# Patient Record
Sex: MALE
[De-identification: names, ages, dates, MRNs, and addresses within clinical notes are randomized per-mention and may not be internally consistent; named-entity substitution may affect disease eponyms.]

---

## 2017-03-20 NOTE — CR
EXAMINATION: Portable chest radiograph.

 

HISTORY: Shortness of breath.

 

FINDINGS: 

The trachea is midline. The cardiomediastinal silhouette is within normal limits. No pulmonary infil
trates, effusions or pneumothorax.

 

Osseous structures appear unremarkable.

 

IMPRESSION: 

No acute cardiopulmonary process.

## 2017-03-20 NOTE — EDM.PDOC
ED HISTORY OF PRESENT ILLNESS





- General


Chief Complaint: Cardiovascular Problem


Stated Complaint: CHEST PAIN


Time Seen by Provider: 03/20/17 13:30





- History of Present Illness


INITIAL COMMENTS - FREE TEXT/NARRATIVE: 





HISTORY AND PHYSICAL:





History of present illness:


The patient is a 41-year-old male with a history of hypertension 

hypercholesterolemia who had angioplasty and stent stents done on January 30 of 

this year at [facility] with Dr. Christensen and presents with complaints of 

feeling irregular heartbeat that started at 3 AM this morning. The patient 

stated a normal day yesterday with no systemic complaints of cough fever chills 

chest pain shortness of breath abdominal pain vomiting or diarrhea and has been 

eating and drinking normally. He states compliance with his medications 

including Plavix. He states he had an angioplasty and 2 stents placed on 

January 30 of this year after being transferred from the ER. He saw Dr. Christensen 

approximately 2 weeks ago in the office and he was cleared to not come back for 

the next 6 months. There were no medication changes on that visit. The patient 

states that at 3 AM this morning he started feeling irregular heart beats and 

that they have been constantly occurring since that time until the present. He 

went to cardiac rehabilitation this morning and states that the nurse saw one 

irregular beat while he was there. He says he came into the ER because they 

have been happening at shorter intervals and occurring more frequently. He 

denies any chest pain or shortness of breath with this no nausea vomiting and 

no abdominal pain. The patient states he has a history of heavy caffeine use 

but has cut back to only one Monster  drink a day


Upon repeated questioning with the patient he says he asked we does not feel 

any discomfort with these irregular beats but because they're happening more 

frequently he is feeling somewhat anxious about them. 


Review of systems: 


As per history of present illness and below otherwise all systems reviewed and 

negative.





Past medical history: 


As per history of present illness and as reviewed below otherwise 

noncontributory.





Surgical history: 


As per history of present illness and as reviewed below otherwise 

noncontributory.





Social history: 


No reported history of drug or alcohol abuse.





Family history: 


As per history of present illness and as reviewed below otherwise 

noncontributory.





Physical exam:


General: Well-developed well-nourished male who is nontoxic and still mildly 

overweight who is in no distress. On the monitor I personally was able to 

witness to random PVCs.


HEENT: Atraumatic, normocephalic, pupils reactive, negative for conjunctival 

pallor or scleral icterus, mucous membranes moist, throat clear, neck supple, 

nontender, trachea midline.


Lungs: Clear to auscultation, breath sounds equal bilaterally, chest nontender.


Heart: S1S2, regular, negative for clicks, rubs, or JVD.


Abdomen: Soft, nondistended, nontender. Negative for masses or 

hepatosplenomegaly. Negative for costovertebral tenderness.


Pelvis: Stable nontender.


Genitourinary: Deferred.


Rectal: Deferred.


Extremities: Atraumatic, negative for cords or calf pain. Neurovascular 

unremarkable. No pedal edema


Neuro: Awake, alert, oriented. Cranial nerves II through XII unremarkable. 

Cerebellum unremarkable. Motor and sensory unremarkable throughout. Exam 

nonfocal.





Diagnostics:


EKG chest x-ray CBC CMP INR troponin





Therapeutics:


IV O2 monitor





1435: While patient was here in the ER he did have an occasional PVC and has 

had heart rates in the [place] 90s with good blood pressures. I discussed the 

case with Dr. Christensen, his cardiologist at [facility] in [place], and he would 

like to do a 24-hour Holter monitor which will help arrange to be done. He also 

recommended that we increase his Toprol XL to 50 mg twice a day. I discussed 

this plan with the patient and wife at bedside and they are comfortable with 

this. I recommended that he return to the ER if anything changes


Impression: 


Irregular heartbeats with history of recent angioplasty and stents stable





Definitive disposition and diagnosis as appropriate pending reevaluation and 

review of above.





- Related Data


Allergies/ADRs: 


 Allergies











Allergy/AdvReac Type Severity Reaction Status Date / Time


 


No Known Allergies Allergy   Verified 03/20/17 13:30











Home Meds: 


 Home Meds





Clopidogrel [Plavix] 75 mg PO BEDTIME 01/29/17 [History]


Isosorbide Dinitrate 30 mg PO BID 01/29/17 [History]


Metoprolol Succinate [Toprol XL 50mg] 50 mg PO DAILY 01/29/17 [History]


atorvaSTATin [Lipitor] 20 mg PO ONETIME 01/29/17 [History]


Cyclobenzaprine [Flexeril] 1 tab PO ASDIRECTED PRN 03/20/17 [History]


Nitroglycerin [Nitrostat] 1 tab SL ASDIRECTED 03/20/17 [History]


Tapentadol HCl [Nucynta] 2 tab PO QID 03/20/17 [History]


Varenicline Tartrate [Chantix] 1 tab PO BID 03/20/17 [History]


Zolpidem [Ambien] 1 tab PO BEDTIME 03/20/17 [History]











Past Medical History





- Past Health History


Medical/Surgical History: Denies Medical/Surgical History


HEENT History: Reports: None


Cardiovascular History: Reports: CAD


Other Cardiovascular History: Heart attack last month; no stents


Respiratory History: Reports: None


Musculoskeletal History: Reports: Other (see below)


Other Musculoskeletal History: Back injury


Psychiatric History: Reports: None





- Infectious Disease History


Infectious Disease History: Reports: Chicken pox





- Past Surgical History


HEENT Surgical History: Reports: None


Cardiovascular Surgical History: Reports: Coronary artery stent, Percutaneous 

transluminal angioplasty


GI Surgical History: Reports: Appendectomy, Hernia, abdominal


Musculoskeletal Surgical History: Reports: None





Social & Family History





- Family History


Family Medical History: Noncontributory


Cardiac: Reports: Hypertension, Other (see below)


Other Cardiac Family History: father have cardiac problem


Endocrine/Metabolic: Reports: Diabetes, type II





- Tobacco Use


Smoking Status *Q: Current Every Day Smoker


Years of Tobacco use: 20


Packs/Tins Daily: 1


Second Hand Smoke Exposure: Yes





- Caffeine Use


Caffeine Use: Reports: Soda


Caffeine Use Comment: 3cups/day





- Recreational Drug Use


Recreational Drug Use: No





ED ROS GENERAL





- Review of Systems


Review Of Systems: ROS reveals no pertinent complaints other than HPI.





ED EXAM, GENERAL





- Physical Exam


Exam: See Below (See dictation)





Course





- Vital Signs


Last Recorded V/S: 


 Last Vital Signs











Temp  36.1 C   03/20/17 13:30


 


Pulse  90   03/20/17 13:30


 


Resp  20   03/20/17 13:30


 


BP  134/82   03/20/17 13:30


 


Pulse Ox  96   03/20/17 13:39














- Orders/Labs/Meds


Orders: 


 Active Orders 24 hr











 Category Date Time Status


 


 Cardiac Monitoring [RC] .AS DIRECTED Care  03/20/17 13:39 Active


 


 EKG Documentation Completion [RC] STAT Care  03/20/17 13:39 Active


 


 Oxygen Therapy, ED [RC] ASDIRECTED Care  03/20/17 13:39 Active


 


 Pulse Oximetry [RC] ASDIRECTED Care  03/20/17 13:39 Active


 


 Sodium Chloride 0.9% [Saline Flush] Med  03/20/17 13:39 Active





 10 ml FLUSH ASDIRECTED PRN   


 


 Sodium Chloride 0.9% [Saline Flush] Med  03/20/17 13:39 Active





 2.5 ml FLUSH ASDIRECTED PRN   


 


 Saline Lock Insert [OM.PC] Stat Oth  03/20/17 13:39 Ordered








 Medication Orders





Sodium Chloride (Saline Flush)  10 ml FLUSH ASDIRECTED PRN


   PRN Reason: Keep Vein Open


Sodium Chloride (Saline Flush)  2.5 ml FLUSH ASDIRECTED PRN


   PRN Reason: Keep Vein Open








Labs: 


 Laboratory Tests











  03/20/17 03/20/17 03/20/17 Range/Units





  13:30 13:30 13:30 


 


WBC  9.48    (4.0-11.0)  K/uL


 


RBC  4.69    (4.50-5.90)  M/uL


 


Hgb  14.4    (13.0-17.0)  g/dL


 


Hct  43.1    (38.0-50.0)  %


 


MCV  91.9    (80.0-98.0)  fL


 


MCH  30.7    (27.0-32.0)  pg


 


MCHC  33.4    (31.0-37.0)  g/dL


 


RDW Std Deviation  43.4    (28.0-62.0)  fl


 


RDW Coeff of Sharif  13    (11.0-15.0)  %


 


Plt Count  260    (150-400)  K/uL


 


MPV  9.60    (7.40-12.00)  fL


 


Neut % (Auto)  61.7    (48.0-80.0)  %


 


Lymph % (Auto)  27.5    (16.0-40.0)  %


 


Mono % (Auto)  8.2    (0.0-15.0)  %


 


Eos % (Auto)  2.1    (0.0-7.0)  %


 


Baso % (Auto)  0.5    (0.0-1.5)  %


 


Neut #  5.8 H    (1.4-5.7)  K/uL


 


Lymph #  2.6 H    (0.6-2.4)  K/uL


 


Mono #  0.8    (0.0-0.8)  K/uL


 


Eos #  0.2    (0.0-0.7)  K/uL


 


Baso #  0.1    (0.0-0.1)  K/uL


 


Nucleated RBC %  0.0    /100WBC


 


Nucleated RBCs #  0    K/uL


 


INR   0.98   (0.86-1.11)  


 


Sodium    137  (136-146)  mmol/L


 


Potassium    3.9  (3.5-5.1)  mmol/L


 


Chloride    102  ()  mmol/L


 


Carbon Dioxide    25  (21-31)  mmol/L


 


BUN    9  (6.0-23.0)  mg/dL


 


Creatinine    1.1  (0.6-1.5)  mg/dL


 


Est Cr Clr Drug Dosing    95.04  mL/min


 


Estimated GFR (MDRD)    > 60.0  ml/min


 


Glucose    126 H  ()  mg/dL


 


Calcium    9.9  (8.8-10.8)  mg/dL


 


Magnesium     (1.5-2.3)  mEq/L


 


Total Bilirubin    0.3  (0.1-1.5)  mg/dL


 


AST    22  (5-40)  IU/L


 


ALT    35  (8-54)  IU/L


 


Alkaline Phosphatase    75  ()  


 


Troponin I     (0.0-0.29)  NG/ML


 


Total Protein    8.2 H  (6.0-8.0)  g/dL


 


Albumin    4.6  (3.5-5.0)  g/dL


 


Globulin    3.6 H  (2.0-3.5)  g/dL


 


Albumin/Globulin Ratio    1.3  (1.3-2.8)  














  03/20/17 03/20/17 Range/Units





  13:30 13:30 


 


WBC    (4.0-11.0)  K/uL


 


RBC    (4.50-5.90)  M/uL


 


Hgb    (13.0-17.0)  g/dL


 


Hct    (38.0-50.0)  %


 


MCV    (80.0-98.0)  fL


 


MCH    (27.0-32.0)  pg


 


MCHC    (31.0-37.0)  g/dL


 


RDW Std Deviation    (28.0-62.0)  fl


 


RDW Coeff of Sharif    (11.0-15.0)  %


 


Plt Count    (150-400)  K/uL


 


MPV    (7.40-12.00)  fL


 


Neut % (Auto)    (48.0-80.0)  %


 


Lymph % (Auto)    (16.0-40.0)  %


 


Mono % (Auto)    (0.0-15.0)  %


 


Eos % (Auto)    (0.0-7.0)  %


 


Baso % (Auto)    (0.0-1.5)  %


 


Neut #    (1.4-5.7)  K/uL


 


Lymph #    (0.6-2.4)  K/uL


 


Mono #    (0.0-0.8)  K/uL


 


Eos #    (0.0-0.7)  K/uL


 


Baso #    (0.0-0.1)  K/uL


 


Nucleated RBC %    /100WBC


 


Nucleated RBCs #    K/uL


 


INR    (0.86-1.11)  


 


Sodium    (136-146)  mmol/L


 


Potassium    (3.5-5.1)  mmol/L


 


Chloride    ()  mmol/L


 


Carbon Dioxide    (21-31)  mmol/L


 


BUN    (6.0-23.0)  mg/dL


 


Creatinine    (0.6-1.5)  mg/dL


 


Est Cr Clr Drug Dosing    mL/min


 


Estimated GFR (MDRD)    ml/min


 


Glucose    ()  mg/dL


 


Calcium    (8.8-10.8)  mg/dL


 


Magnesium   1.8  (1.5-2.3)  mEq/L


 


Total Bilirubin    (0.1-1.5)  mg/dL


 


AST    (5-40)  IU/L


 


ALT    (8-54)  IU/L


 


Alkaline Phosphatase    ()  


 


Troponin I  < 0.10   (0.0-0.29)  NG/ML


 


Total Protein    (6.0-8.0)  g/dL


 


Albumin    (3.5-5.0)  g/dL


 


Globulin    (2.0-3.5)  g/dL


 


Albumin/Globulin Ratio    (1.3-2.8)  











Meds: 


Medications











Generic Name Dose Route Start Last Admin





  Trade Name Freq  PRN Reason Stop Dose Admin


 


Sodium Chloride  10 ml  03/20/17 13:39  





  Saline Flush  FLUSH   





  ASDIRECTED PRN   





  Keep Vein Open   


 


Sodium Chloride  2.5 ml  03/20/17 13:39  





  Saline Flush  FLUSH   





  ASDIRECTED PRN   





  Keep Vein Open   














Departure





- Departure


Time of Disposition: 14:42


Disposition: Home, Self-Care 01


Condition: good


Clinical Impression: 


 Irregular heart beats





Forms:  ED Department Discharge


Additional Instructions: 


The following information is given to patients seen in the emergency department 

who are being discharged to home. This information is to outline your options 

for follow-up care. We provide all patients seen in our emergency department 

with a follow-up referral.





The need for follow-up, as well as the timing and circumstances, are variable 

depending upon the specifics of your emergency department visit.





If you don't have a primary care physician on staff, we will provide you with a 

referral. We always advise you to contact your personal physician following an 

emergency department visit to inform them of the circumstance of the visit and 

for follow-up with them and/or the need for any referrals to a consulting 

specialist.





The emergency department will also refer you to a specialist when appropriate. 

This referral assures that you have the opportunity for followup care with a 

specialist. All of these measure are taken in an effort to provide you with 

optimal care, which includes your followup.





Under all circumstances we always encourage you to contact your private 

physician who remains a resource for coordinating  your care. When calling for 

followup care, please make the office aware that this follow-up is from your 

recent emergency room visit. If for any reason you are refused follow-up, 

please contact the [facility] emergency 

department at [phone] and ask to speak to the emergency department 

charge nurse.





[facility] 


Primary care- [place] [address]


[phone]








Please go and have your Holter monitor done as directed by my nurses. Please 

call and schedule followup with Dr. Christensen at [facility] in [place] and also 

with primary care here locally. Return to ER as needed and as discussed. 

Increase your Toprol XL, metoprolol, from 50 mg once a day 50 mg twice a day.





- My Orders


Last 24 Hours: 


My Active Orders





03/20/17 13:39


Cardiac Monitoring [RC] .AS DIRECTED 


EKG Documentation Completion [RC] STAT 


Oxygen Therapy, ED [RC] ASDIRECTED 


Pulse Oximetry [RC] ASDIRECTED 


Sodium Chloride 0.9% [Saline Flush]   10 ml FLUSH ASDIRECTED PRN 


Sodium Chloride 0.9% [Saline Flush]   2.5 ml FLUSH ASDIRECTED PRN 


Saline Lock Insert [OM.PC] Stat 














- Assessment/Plan


Last 24 Hours: 


My Active Orders





03/20/17 13:39


Cardiac Monitoring [RC] .AS DIRECTED 


EKG Documentation Completion [RC] STAT 


Oxygen Therapy, ED [RC] ASDIRECTED 


Pulse Oximetry [RC] ASDIRECTED 


Sodium Chloride 0.9% [Saline Flush]   10 ml FLUSH ASDIRECTED PRN 


Sodium Chloride 0.9% [Saline Flush]   2.5 ml FLUSH ASDIRECTED PRN 


Saline Lock Insert [OM.PC] Stat

## 2017-06-11 NOTE — PCM.HP
H&P History of Present Illness





- History of Present Illness


Initial Comments - Free Text/Narative: 





40 yo male with pmh of CAD with MI and two stents placed five months ago.  He 

has been compliant with his medications in has not had to use nitro for chest 

pain in the past three months.  He presents to the ED with complaints of chest 

pain. He used nitro SL x3 at home with some releif.  He was evaluated in the ED 

with EKG and cardiac enzymes not showing signs of ischemia.  CTA of chest was 

normal.  He denies any shortness of breath, diaphoresis or cough.


  ** Anterior Chest


Pain Score (Numeric/FACES): 3





- Related Data


Allergies/Adverse Reactions: 


 Allergies











Allergy/AdvReac Type Severity Reaction Status Date / Time


 


No Known Allergies Allergy   Verified 06/10/17 23:39











Home Medications: 


 Home Meds





Clopidogrel [Plavix] 75 mg PO BEDTIME 01/29/17 [History]


Isosorbide Dinitrate 30 mg PO BID 01/29/17 [History]


Metoprolol Succinate [Toprol XL 50mg] 50 mg PO BID 01/29/17 [History]


atorvaSTATin [Lipitor] 20 mg PO BEDTIME 01/29/17 [History]


Cyclobenzaprine [Flexeril] 1 tab PO QID PRN 03/20/17 [History]


Nitroglycerin [Nitrostat] 1 tab SL ASDIRECTED 03/20/17 [History]


Tapentadol HCl [Nucynta] 1 tab PO TID 03/20/17 [History]


Aspirin 81 mg PO BRK 06/10/17 [History]











Past Medical History





- Past Health History


Medical/Surgical History: Denies Medical/Surgical History


HEENT History: Reports: None


Cardiovascular History: Reports: MI, Stents


Other Cardiovascular History: Heart attak last month;  no stents


Respiratory History: Reports: None


Gastrointestinal History: Reports: None


Genitourinary History: Reports: None


Musculoskeletal History: Reports: None


Other Musculoskeletal History: Back injury


Neurological History: Reports: None


Psychiatric History: Reports: None


Endocrine/Metabolic History: Reports: None


Hematologic History: Reports: None


Immunologic History: Reports: None


Oncologic (Cancer) History: Reports: None


Dermatologic History: Reports: None





- Infectious Disease History


Infectious Disease History: Reports: None





- Past Surgical History


Head Surgeries/Procedures: Reports: None


HEENT Surgical History: Reports: None


Cardiovascular Surgical History: Reports: Coronary Artery Stent, Percutaneous 

Transluminal Angioplasty


GI Surgical History: Reports: Appendectomy, Hernia, Abdominal


Male  Surgical History: Reports: None


Neurological Surgical History: Reports: None


Musculoskeletal Surgical History: Reports: None





Social & Family History





- Family History


Family Medical History: Noncontributory


Cardiac: Reports: Hypertension, Other (See Below)


Other Cardiac Family History: father have cardiac problem


Respiratory: Reports: None


GI: Reports: None


: Reports: None


Musculoskeletal: Reports: None


Neurological: Reports: None


Psychiatric: Reports: None


Endocrine/Metabolic: Reports: None, Diabetes, type II


Hematologic: Reports: None


Immunologic: Reports: None


Dermatologic: Reports: None


Oncologic: Reports: None





- Tobacco Use


Smoking Status *Q: Current Every Day Smoker


Years of Tobacco use: 20


Packs/Tins Daily: 1


Used Tobacco, but Quit: No


Month Tobacco Last Used: June


Second Hand Smoke Exposure: Yes





- Caffeine Use


Caffeine Use: Reports: Coffee, Energy Drinks


Caffeine Use Comment: 1 monster everyday





- Alcohol Use


Days Per Week of Alcohol Use: 1


Number of Drinks Per Day: 1


Total Drinks Per Week: 1


Date of Last Drink: 06/10/17


Time of Last Drink: 20:00





- Recreational Drug Use


Recreational Drug Use: No





H&P Review of Systems





- Review of Systems:


Review Of Systems: See Below


General: Reports: No Symptoms


HEENT: Reports: No Symptoms


Pulmonary: Reports: No Symptoms


Cardiovascular: Reports: No Symptoms


Gastrointestinal: Reports: No Symptoms


Genitourinary: Reports: No Symptoms


Musculoskeletal: Reports: No Symptoms


Skin: Reports: No Symptoms


Psychiatric: Reports: No Symptoms


Neurological: Reports: No Symptoms


Hematologic/Lymphatic: Reports: No Symptoms


Immunologic: Reports: No Symptoms





Exam





- Exam


Exam: See Below





- Vital Signs


Vital Signs: 


 Last Vital Signs











Temp  36.1 C   06/11/17 08:00


 


Pulse  71   06/11/17 08:50


 


Resp  20   06/11/17 08:00


 


BP  130/69   06/11/17 08:50


 


Pulse Ox  92 L  06/11/17 08:00











Weight: 122.7 kg





- Exam


General: Alert, Oriented, 4


Neck: Supple, Trachea Midline.  No: JVD


Lungs: Clear to Auscultation, Normal Respiratory Effort


Cardiovascular: Regular Rate, Regular Rhythm


Abdomen: Normal Bowel Sounds, Soft


Extremities: Normal Inspection


Skin: Warm, Dry, Intact


Neurological: No: Focal Deficit





- Patient Data


Lab Results last 24 hrs: 


 Laboratory Results - last 24 hr











  06/11/17 06/11/17 06/11/17 Range/Units





  05:50 05:50 05:50 


 


WBC   10.62   (4.0-11.0)  K/uL


 


RBC   4.35 L   (4.50-5.90)  M/uL


 


Hgb   13.5   (13.0-17.0)  g/dL


 


Hct   39.9   (38.0-50.0)  %


 


MCV   91.7   (80.0-98.0)  fL


 


MCH   31.0   (27.0-32.0)  pg


 


MCHC   33.8   (31.0-37.0)  g/dL


 


RDW Std Deviation   43.0   (28.0-62.0)  fl


 


RDW Coeff of Sharif   13   (11.0-15.0)  %


 


Plt Count   211   (150-400)  K/uL


 


MPV   10.20   (7.40-12.00)  fL


 


Neut % (Auto)   62.8   (48.0-80.0)  %


 


Lymph % (Auto)   26.9   (16.0-40.0)  %


 


Mono % (Auto)   7.6   (0.0-15.0)  %


 


Eos % (Auto)   2.4   (0.0-7.0)  %


 


Baso % (Auto)   0.3   (0.0-1.5)  %


 


Neut # (Auto)   6.7 H   (1.4-5.7)  K/uL


 


Lymph # (Auto)   2.9 H   (0.6-2.4)  K/uL


 


Mono # (Auto)   0.8   (0.0-0.8)  K/uL


 


Eos # (Auto)   0.3   (0.0-0.7)  K/uL


 


Baso # (Auto)   0.0   (0.0-0.1)  K/uL


 


Nucleated RBC %   0.0   /100WBC


 


Nucleated RBCs #   0   K/uL


 


Sodium    138  (136-146)  mmol/L


 


Potassium    4.1  (3.5-5.1)  mmol/L


 


Chloride    105  ()  mmol/L


 


Carbon Dioxide    25  (21-31)  mmol/L


 


BUN    13  (6.0-23.0)  mg/dL


 


Creatinine    1.0  (0.6-1.5)  mg/dL


 


Est Cr Clr Drug Dosing    104.54  mL/min


 


Estimated GFR (MDRD)    > 60.0  ml/min


 


Glucose    139 H  ()  mg/dL


 


Calcium    8.6 L  (8.8-10.8)  mg/dL


 


Troponin I  < 0.10    (0.0-0.29)  NG/ML











Result Diagrams: 


 06/11/17 05:50





 06/11/17 05:50





*Q Meaningful Use (ADM)





- VTE *Q


VTE Criteria *Q: 








- Stroke *Q


Stroke Criteria *Q: 








- AMI *Q


AMI Criteria *Q: 





Problem List Initiated/Reviewed/Updated: Yes


Orders Last 24hrs: 


 Active Orders 24 hr











 Category Date Time Status


 


 Communication Order [RC] DAILY Care  06/11/17 03:37 Active


 


 Communication Order [RC] ROUTINE Care  06/11/17 08:09 Active


 


 Telemetry Monitoring [Cardiac Monitoring] [RC] .AS Care  06/11/17 02:25 Active





 DIRECTED   


 


 Heart Healthy Diet [DIET] Diet  Breakfast Active


 


 TROPONIN I [CHEM] Q6H Lab  06/11/17 11:45 Ordered


 


 Acetaminophen [Tylenol] Med  06/11/17 08:09 Active





 650 mg PO Q6H PRN   


 


 Aspirin Med  06/11/17 09:00 Active





 81 mg PO DAILY   


 


 Clopidogrel [Plavix] Med  06/11/17 21:00 Active





 75 mg PO BEDTIME   


 


 Cyclobenzaprine [Flexeril] Med  06/11/17 03:37 Active





 10 mg PO QID PRN   


 


 Isosorbide Dinitrate [Isordil] Med  06/11/17 09:00 Active





 30 mg PO BID   


 


 Metoprolol Succinate [Toprol XL] Med  06/11/17 09:00 Active





 50 mg PO BID   


 


 Morphine Med  06/11/17 03:19 Active





 2 mg IVPUSH Q3H PRN   


 


 Nitroglycerin [Nitrostat] Med  06/11/17 03:45 Active





 0.4 mg SL ASDIRECTED   


 


 Patient's Own Medication [Ptom] Med  06/11/17 09:00 Active





 1 each PO TID@0900,1500,2200   


 


 atorvaSTATin [Lipitor] Med  06/11/17 21:00 Active





 20 mg PO BEDTIME   








 Medication Orders





Acetaminophen (Tylenol)  650 mg PO Q6H PRN


   PRN Reason: Pain


   Last Admin: 06/11/17 08:50  Dose: 650 mg


Aspirin (Aspirin)  81 mg PO DAILY AL


   Last Admin: 06/11/17 08:49  Dose: 81 mg


Atorvastatin Calcium (Lipitor)  20 mg PO BEDTIME [facility]


Clopidogrel Bisulfate (Plavix)  75 mg PO BEDTIME [facility]


Cyclobenzaprine HCl (Flexeril)  10 mg PO QID PRN


   PRN Reason: back pain


Isosorbide Dinitrate (Isordil)  30 mg PO BID [facility]


   Last Admin: 06/11/17 08:50  Dose: 30 mg


Metoprolol Succinate (Toprol Xl)  50 mg PO BID [facility]


   Last Admin: 06/11/17 08:50  Dose: 50 mg


Morphine Sulfate (Morphine)  2 mg IVPUSH Q3H PRN


   PRN Reason: Pain


   Last Admin: 06/11/17 04:06  Dose: 2 mg


Nitroglycerin (Nitrostat)  0.4 mg SL ASDIRECTED [facility]


Tapentadol 50mg  1 each PO TID@0900,1500,2200 [facility]


   Last Admin: 06/11/17 08:50  Dose: 1 each








Assessment/Plan Comment:: 





40 yo male who presented with chest pain.  He has ruled out for acute coronary 

syndrome with serial negative cardiac enzymes.  He has had no events on 

telemetry overnight.  He is being discharged home and will follow up with Dr. Solomon next week.

## 2017-06-11 NOTE — EDM.PDOC
ED HPI GENERAL MEDICAL PROBLEM





- General


Chief Complaint: Chest Pain


Stated Complaint: CHEST PAIN


Time Seen by Provider: 06/10/17 23:36


Source of Information: Reports: Patient


History Limitations: Reports: No Limitations





- History of Present Illness


INITIAL COMMENTS - FREE TEXT/NARRATIVE: 





HISTORY AND PHYSICAL:





History of present illness:


[41-year-old male history of prior MI with multiple stents placed now presents 

emergent [place] complaining of chest pain similar to his previous cardiac 

pain. Patient had onset of chest pain tonight pressure type no radiation mild 

shortness of air no pleuritic pain no productive cough or fever. Pain is not 

worse with movement. He did take 3 nitroglycerin at home with some relief. She 

did not take aspirin today. He is on Plavix with which he is compliant]





Review of systems: 


As per history of present illness and below otherwise all systems reviewed and 

negative.





Past medical history: 


As per history of present illness and as reviewed below otherwise 

noncontributory.





Surgical history: 


As per history of present illness and as reviewed below otherwise 

noncontributory.





Social history: 


No reported history of drug or alcohol abuse.





Family history: 


As per history of present illness and as reviewed below otherwise 

noncontributory.





Physical exam:


HEENT: Atraumatic, normocephalic, pupils reactive, negative for conjunctival 

pallor or scleral icterus, mucous membranes moist, throat clear, neck supple, 

nontender, trachea midline.


Lungs: Clear to auscultation, breath sounds equal bilaterally, chest nontender.


Heart: S1S2, regular, negative for clicks, rubs, or JVD.


Abdomen: Soft, nondistended, nontender. Negative for masses or 

hepatosplenomegaly. Negative for costovertebral tenderness.


Pelvis: Stable nontender.


Genitourinary: Deferred.


Rectal: Deferred.


Extremities: Atraumatic, negative for cords or calf pain. Neurovascular 

unremarkable.


Neuro: Awake, alert, oriented. Cranial nerves grossly unremarkable. Cerebellum 

unremarkable. Motor and sensory unremarkable throughout. Exam nonfocal.





Diagnostics:


[EKG normal sinus rhythm at 80 normal axis no STEMI


Chest x-ray ordered Megaly chronic changes no acute disease no change prior 

study]





Therapeutics:


[Aspirin Nitropaste given]





Impression: 


[]





Plan:


[Signs and symptoms consistent with chest pain a possible cardiac etiology in a 

patient with a known coronary artery disease history. EKG and chest x-ray 

unremarkable. CT of the chest pending to rule out less likely possibility of 

aortic involvement. Aspirin Nitropaste given. Labs pending including troponin. 

We will do observation telemetry admission and discussed with Dr. Abelardo Alas 

hospitalist on call.]





Definitive disposition and diagnosis as appropriate pending reevaluation and 

review of above.





  ** Anterior Chest


Pain Score (Numeric/FACES): 7





- Related Data


 Allergies











Allergy/AdvReac Type Severity Reaction Status Date / Time


 


No Known Allergies Allergy   Verified 06/10/17 23:39











Home Meds: 


 Home Meds





Clopidogrel [Plavix] 75 mg PO BEDTIME 01/29/17 [History]


Isosorbide Dinitrate 30 mg PO BID 01/29/17 [History]


Metoprolol Succinate [Toprol XL 50mg] 50 mg PO BID 01/29/17 [History]


atorvaSTATin [Lipitor] 20 mg PO ONETIME 01/29/17 [History]


Cyclobenzaprine [Flexeril] 1 tab PO ASDIRECTED PRN 03/20/17 [History]


Nitroglycerin [Nitrostat] 1 tab SL ASDIRECTED 03/20/17 [History]


Tapentadol HCl [Nucynta] 1 tab PO TID 03/20/17 [History]


Aspirin 81 mg PO BRK 06/10/17 [History]











Past Medical History





- Past Health History


Medical/Surgical History: Denies Medical/Surgical History


HEENT History: Reports: None


Cardiovascular History: Reports: MI, Stents


Other Cardiovascular History: Heart attack last month; no stents


Respiratory History: Reports: None


Gastrointestinal History: Reports: None


Genitourinary History: Reports: None


Musculoskeletal History: Reports: None


Other Musculoskeletal History: Back injury


Neurological History: Reports: None


Psychiatric History: Reports: None


Endocrine/Metabolic History: Reports: None


Hematologic History: Reports: None


Immunologic History: Reports: None


Oncologic (Cancer) History: Reports: None


Dermatologic History: Reports: None





- Infectious Disease History


Infectious Disease History: Reports: None





- Past Surgical History


Head Surgeries/Procedures: Reports: None


HEENT Surgical History: Reports: None


Cardiovascular Surgical History: Reports: Coronary Artery Stent, Percutaneous 

Transluminal Angioplasty


GI Surgical History: Reports: Appendectomy, Hernia, Abdominal


Male  Surgical History: Reports: None


Neurological Surgical History: Reports: None


Musculoskeletal Surgical History: Reports: None





Social & Family History





- Family History


Family Medical History: Noncontributory


Cardiac: Reports: Hypertension, Other (See Below)


Other Cardiac Family History: father have cardiac problem


Endocrine/Metabolic: Reports: Diabetes, type II





- Tobacco Use


Smoking Status *Q: Current Every Day Smoker


Years of Tobacco use: 1


Packs/Tins Daily: 20


Second Hand Smoke Exposure: Yes





- Caffeine Use


Caffeine Use: Reports: Energy Drinks


Caffeine Use Comment: 1 monster everyday





- Recreational Drug Use


Recreational Drug Use: No





ED ROS GENERAL





- Review of Systems


Review Of Systems: See Below (History of present illness)





ED EXAM, GENERAL





- Physical Exam


Exam: See Below (History of present illness)





Course





- Vital Signs


Last Recorded V/S: 


 Last Vital Signs











Temp  35.6 C   06/10/17 23:40


 


Pulse  81   06/11/17 01:48


 


Resp  18   06/11/17 01:48


 


BP  112/71   06/11/17 01:48


 


Pulse Ox  95   06/11/17 01:48














- Orders/Labs/Meds


Orders: 


 Active Orders 24 hr











 Category Date Time Status


 


 Admission Status [Patient Status] [ADT] Stat ADT  06/11/17 02:18 Active


 


 EKG 12 Lead [EKG Documentation Completion] [RC] STAT Care  06/10/17 23:50 

Active


 


 Ang Abdomen [CT] Stat Exams  06/11/17 Taken


 


 CTA Chest W WO Contrast [Ang Chest] [CT] Stat Exams  06/11/17 00:16 Taken


 


 Chest 1V Frontal [CR] Stat Exams  06/10/17 23:51 Taken











Labs: 


 Laboratory Tests











  06/10/17 06/10/17 06/10/17 Range/Units





  23:43 23:43 23:43 


 


WBC  13.99 H    (4.0-11.0)  K/uL


 


RBC  4.78    (4.50-5.90)  M/uL


 


Hgb  15.2    (13.0-17.0)  g/dL


 


Hct  43.7    (38.0-50.0)  %


 


MCV  91.4    (80.0-98.0)  fL


 


MCH  31.8    (27.0-32.0)  pg


 


MCHC  34.8    (31.0-37.0)  g/dL


 


RDW Std Deviation  42.8    (28.0-62.0)  fl


 


RDW Coeff of Sharif  13    (11.0-15.0)  %


 


Plt Count  250    (150-400)  K/uL


 


MPV  9.90    (7.40-12.00)  fL


 


Neut % (Auto)  65.1    (48.0-80.0)  %


 


Lymph % (Auto)  24.2    (16.0-40.0)  %


 


Mono % (Auto)  8.6    (0.0-15.0)  %


 


Eos % (Auto)  1.7    (0.0-7.0)  %


 


Baso % (Auto)  0.4    (0.0-1.5)  %


 


Neut # (Auto)  9.1 H    (1.4-5.7)  K/uL


 


Lymph # (Auto)  3.4 H    (0.6-2.4)  K/uL


 


Mono # (Auto)  1.2 H    (0.0-0.8)  K/uL


 


Eos # (Auto)  0.2    (0.0-0.7)  K/uL


 


Baso # (Auto)  0.1    (0.0-0.1)  K/uL


 


Nucleated RBC %  0.0    /100WBC


 


Nucleated RBCs #  0    K/uL


 


Sodium   140   (136-146)  mmol/L


 


Potassium   3.8   (3.5-5.1)  mmol/L


 


Chloride   104   ()  mmol/L


 


Carbon Dioxide   26   (21-31)  mmol/L


 


BUN   12   (6.0-23.0)  mg/dL


 


Creatinine   1.2   (0.6-1.5)  mg/dL


 


Est Cr Clr Drug Dosing   87.12   mL/min


 


Estimated GFR (MDRD)   > 60.0   ml/min


 


Glucose   96   ()  mg/dL


 


Calcium   9.4   (8.8-10.8)  mg/dL


 


Total Bilirubin   0.4   (0.1-1.5)  mg/dL


 


AST   27   (5-40)  IU/L


 


ALT   37   (8-54)  IU/L


 


Alkaline Phosphatase   84   ()  


 


CK-MB (CK-2)   1.0   (0-6.6)  ng/ml


 


Troponin I    < 0.10  (0.0-0.29)  NG/ML


 


Total Protein   7.8   (6.0-8.0)  g/dL


 


Albumin   4.7   (3.5-5.0)  g/dL


 


Globulin   3.1   (2.0-3.5)  g/dL


 


Albumin/Globulin Ratio   1.5   (1.3-2.8)  











Meds: 


Medications














Discontinued Medications














Generic Name Dose Route Start Last Admin





  Trade Name Rhona  PRN Reason Stop Dose Admin


 


Hydrocodone Bitart/Acetaminophen  1 tab  06/10/17 23:51  06/11/17 00:05





  Norco 325-5 Mg  PO  06/10/17 23:52  1 tab





  ONETIME ONE   Administration


 


Aspirin  324 mg  06/10/17 23:57  06/11/17 00:04





  Aspirin  PO  06/10/17 23:58  324 mg





  ONETIME ONE   Administration


 


Iopamidol  100 ml  06/11/17 01:17  06/11/17 01:18





  Isovue Multipack-370 (76%)  IVPUSH  06/11/17 01:18  100 ml





  ONETIME STA   Administration


 


Nitroglycerin  1 gm  06/10/17 23:51  06/11/17 00:05





  Nitro-Bid 2%  TOP  06/10/17 23:52  1 gm





  ONETIME ONE   Administration














Departure





- Departure


Time of Disposition: 00:44


Disposition: Refer to Observation


Condition: good


Clinical Impression: 


 Chest pain








- Discharge Information


Referrals: 


Rogelio Solomon MD [Primary Care Provider] - 


Forms:  ED Department Discharge





- My Orders


Last 24 Hours: 


My Active Orders





06/10/17 23:50


EKG 12 Lead [EKG Documentation Completion] [RC] STAT 





06/10/17 23:51


Chest 1V Frontal [CR] Stat 





06/11/17


Ang Abdomen [CT] Stat 





06/11/17 00:16


CTA Chest W WO Contrast [Ang Chest] [CT] Stat 





06/11/17 02:18


Admission Status [Patient Status] [ADT] Stat 














- Assessment/Plan


Last 24 Hours: 


My Active Orders





06/10/17 23:50


EKG 12 Lead [EKG Documentation Completion] [RC] STAT 





06/10/17 23:51


Chest 1V Frontal [CR] Stat 





06/11/17


Ang Abdomen [CT] Stat 





06/11/17 00:16


CTA Chest W WO Contrast [Ang Chest] [CT] Stat 





06/11/17 02:18


Admission Status [Patient Status] [ADT] Stat

## 2017-06-12 NOTE — CT
EXAM DATE: 17



PATIENT'S AGE: 41





Patient: BRINDA HANSEN



Facility: [place], [place]

Patient ID: [id number]

Site Patient ID: [id number].

Site Accession #: [id number]

: 1975

Study: CT Chest Angio ABDOMEN [id number]/11/2017 1:27:22 AM

Ordering Physician: Philippe Vazquez



Final Report: 

INDICATION:

Chest pain 



TECHNIQUE:

CT chest, abdomen and pelvis acquired with and without IV contrast, dissection 
protocol. The lower pelvis is not imaged.



COMPARISON:

Chest radiograph 06/10/2017, 2017. 



FINDINGS:

Chest:

Cardiovascular structures: The unenhanced images demonstrate no evidence of 
aortic intramural hematoma. The entire aorta is normal in caliber and there is 
no sign of aortic dissection. Heart size is normal. Coronary artery 
calcification noted. 

Mediastinum and adriane: No mass or adenopathy. 

Lungs: No pneumothorax. The lungs are clear. 

Pleura and pericardium: No effusions. 

Chest wall and axilla: No mass or adenopathy. 

Abdomen and Pelvis:

Liver: Unremarkable. 

Spleen: Unremarkable. 

Pancreas: Unremarkable. 

Gallbladder and bile ducts: Unremarkable. 

Kidneys: Unremarkable. 

Adrenal glands: Unremarkable. 

GI tract: There is colonic diverticulosis without evidence of diverticulitis. 
Small bowel is normal. 

Vascular structures: No abdominal aortic aneurysm. There is minimal 
atherosclerotic calcification. There are 2 left renal arteries, the cephalad of 
which is dominant. Mesenteric vasculature is patent. 

Lymph nodes: Unremarkable. 

Miscellaneous: No ascites. No free air.

Pelvic Organs: The visualized urinary bladder is normal. 

Bones: No acute abnormality. No suspicious bone lesion.



IMPRESSION:

1. No acute abnormality in the chest, abdomen and pelvis. No signs of aortic 
dissection or other acute abnormality. 

2. Colonic diverticulosis.



Dictated by Pancho Boyd MD @ 2017 2:17:36 AM





Dictated by: Pancho Boyd MD @ 2017 02:17:48

(Electronic Signature)



Report Signed by Proxy.
JEANNE

## 2017-06-12 NOTE — CR
EXAM DATE: 17



PATIENT'S AGE: 41





Patient: BRINDA HANSEN



Facility: [place], [place]

Patient ID: [id number]

Site Patient ID: [id number].

Site Accession #: [id number]

: 1975

Study: XRay Chest [id number]/11/2017 12:04:03 AM

Ordering Physician: Philippe Vazquez



Final Report: 

INDICATION: 

CHEST PAIN 







TECHNIQUE:

Chest 1 view 



COMPARISON:

None 



FINDINGS:

Cardiovascular and mediastinum: Stable cardiac silhouette. Slight indistinct 
central vascularity. 

Lungs and pleural space: No focal consolidation. No sign of pleural effusion. 
No pneumothorax. 

Bones and soft tissues: No significant findings. 



IMPRESSION:

Slight indistinct central vascularity. Findings suggesting a component of 
pulmonary edema. Please correlate clinically and with laboratory findings.



Dictated by Lauri Darnell MD @ 2017 12:35:15 AM





Dictated by: Lauri Darnell MD @ 2017 00:35:35

(Electronic Signature)



Report Signed by Proxy.
[facility]

## 2017-06-12 NOTE — CT
EXAM DATE: 17



PATIENT'S AGE: 41





Patient: BRINDA HANSEN



Facility: [place], [place]

Patient ID: [id number]

Site Patient ID: [id number].

Site Accession #: [id number]

: 1975

Study: CT Chest Angio ABDOMEN [id number]/11/2017 1:27:22 AM

Ordering Physician: Philippe Vazquez



Final Report: 

INDICATION:

Chest pain 



TECHNIQUE:

CT chest, abdomen and pelvis acquired with and without IV contrast, dissection 
protocol. The lower pelvis is not imaged.



COMPARISON:

Chest radiograph 06/10/2017, 2017. 



FINDINGS:

Chest:

Cardiovascular structures: The unenhanced images demonstrate no evidence of 
aortic intramural hematoma. The entire aorta is normal in caliber and there is 
no sign of aortic dissection. Heart size is normal. Coronary artery 
calcification noted. 

Mediastinum and adriane: No mass or adenopathy. 

Lungs: No pneumothorax. The lungs are clear. 

Pleura and pericardium: No effusions. 

Chest wall and axilla: No mass or adenopathy. 

Abdomen and Pelvis:

Liver: Unremarkable. 

Spleen: Unremarkable. 

Pancreas: Unremarkable. 

Gallbladder and bile ducts: Unremarkable. 

Kidneys: Unremarkable. 

Adrenal glands: Unremarkable. 

GI tract: There is colonic diverticulosis without evidence of diverticulitis. 
Small bowel is normal. 

Vascular structures: No abdominal aortic aneurysm. There is minimal 
atherosclerotic calcification. There are 2 left renal arteries, the cephalad of 
which is dominant. Mesenteric vasculature is patent. 

Lymph nodes: Unremarkable. 

Miscellaneous: No ascites. No free air.

Pelvic Organs: The visualized urinary bladder is normal. 

Bones: No acute abnormality. No suspicious bone lesion.



IMPRESSION:

1. No acute abnormality in the chest, abdomen and pelvis. No signs of aortic 
dissection or other acute abnormality. 

2. Colonic diverticulosis.



Dictated by Pancho Boyd MD @ 2017 2:17:36 AM





Dictated by: Pancho Boyd MD @ 2017 02:17:48

(Electronic Signature)



Report Signed by Proxy.
JEANNE

## 2018-03-06 NOTE — EDM.PDOC
ED HPI GENERAL MEDICAL PROBLEM





- General


Chief Complaint: Chest Pain


Stated Complaint: CHEST PAIN


Time Seen by Provider: 03/06/18 20:29





- History of Present Illness


INITIAL COMMENTS - FREE TEXT/NARRATIVE: 





HISTORY AND PHYSICAL:





History of present illness:


The patient is a 42-year-old male who presents with left upper chest pain/

pressure that has been occurring the last 5 nights when he is laying in bed 

trying to go to sleep. He tells me that he is fine all day and when he is 

laying in bed he starts getting the discomfort in that it keeps him up all 

night and then it will go away by 8:00 in the morning. Each time this has 

occurred he has taken a sublingual nitroglycerin for total of 2 every night and 

that has helped the discomfort. He says that 5 days ago he was doing some heavy 

strenuous lifting and he thought he may of injured himself because this started 

after that. He has not taken anything specifically for musculoskeletal pain. He 

is a smoker and has been for many years and has no other social history. He has 

a significant paternal family history for cardiac disease and he underwent 

angioplasty and stents 2 of the LAD on January 30 of 2017 at [facility] 

in [place]. He last saw Dr. Christensen the cardiologist in September and he was told 

that he did not need to return for one year. He has Hypercholesterolemia and 

hypertension and takes medication for that and takes one baby aspirin per day 

along with Plavix. Currently in the ED the patient says that he is not having 

any discomfort as it is "not late enough start" . He says he only came here at 

his wife's insistence. He has been eating and drinking normally and has no 

abdominal pain and he has no leg pain or swelling. He says he has had shortness 

of breath which has been ongoing for the last 6 or more months and is not new 

or different with the chest pain. He points to an area at the left upper chest 

wall and says the discomfort is aching and pressure-like but it does not 

radiate and he does not get diaphoretic. He tells me that this is the location 

of his heart pain but is not the same character. Patient follows with a 

provider at [facility] for his regular checkups and care and he has not 

seen that person in the last 5 days. He denies any upper respiratory symptoms.





Review of systems: 


As per history of present illness and below otherwise all systems reviewed and 

negative.





Past medical history: 


As per history of present illness and as reviewed below otherwise 

noncontributory.





Surgical history: 


As per history of present illness and as reviewed below otherwise 

noncontributory.





Social history: 


No reported history of drug or alcohol abuse.





Family history: 


As per history of present illness and as reviewed below otherwise 

noncontributory.





Physical exam:


Gen.: Well-developed well-nourished man who is nontoxic and overweight. He 

speaks clearly and easily in the ED and vital signs were noted by me


HEENT: Atraumatic, normocephalic,  negative for conjunctival pallor or scleral 

icterus, mucous membranes moist, throat clear, neck supple, nontender, trachea 

midline.


Lungs: Clear to auscultation, breath sounds equal bilaterally, chest nontender. 

No worker breathing stridor or wheezing


Heart: S1S2, regular, negative for clicks, rubs, or JVD.


Abdomen: Soft, nondistended, nontender. Negative for masses or 

hepatosplenomegaly. Negative for costovertebral tenderness.


Pelvis: Stable nontender.


Genitourinary: Deferred.


Rectal: Deferred.


Extremities: Atraumatic, negative for cords or calf pain. Neurovascular 

unremarkable. No pedal edema or leg asymmetry


Neuro: Awake, alert, oriented. Cranial nerves II through XII unremarkable. 

Cerebellum unremarkable. Motor and sensory unremarkable throughout. Exam 

nonfocal.


Skin: There is no diaphoresis no overt rashes or lesions and normal turgor


Diagnostics:


EKG chest x-ray CBC CMP troponin INR





Therapeutics:


3 baby aspirin, patient took one this morning already





Case was discussed with Dr. Alas at 2135 and he accepts the patient for 

observation admission. The patient is currently playing video games on his cell 

phone and has had no chest pain here. He is aware of all testing results and my 

concerns regarding his history even though this chest pain pressure seems 

somewhat atypical. He is agreeable for observation and rule out.





Impression: 


Chest pain with history of angioplasty and stents 1 year ago, rule out ACS





Definitive disposition and diagnosis as appropriate pending reevaluation and 

review of above.





- Related Data


 Allergies











Allergy/AdvReac Type Severity Reaction Status Date / Time


 


No Known Allergies Allergy   Verified 03/06/18 20:39











Home Meds: 


 Home Meds





Clopidogrel [Plavix] 75 mg PO DAILY 01/29/17 [History]


Isosorbide Dinitrate 30 mg PO BID 01/29/17 [History]


Metoprolol Succinate [Toprol XL 50mg] 50 mg PO BID 01/29/17 [History]


atorvaSTATin [Lipitor] 20 mg PO BEDTIME 01/29/17 [History]


Cyclobenzaprine [Flexeril] 1 tab PO TID PRN 03/20/17 [History]


Nitroglycerin [Nitrostat] 1 tab SL ASDIRECTED 03/20/17 [History]


Tapentadol HCl [Nucynta] 1 tab PO TID 03/20/17 [History]


Aspirin 81 mg PO DAILY 06/10/17 [History]











Past Medical History





- Past Health History


Medical/Surgical History: Denies Medical/Surgical History


HEENT History: Reports: None


Cardiovascular History: Reports: MI, Stents


Other Cardiovascular History: Heart attak last month;  no stents


Respiratory History: Reports: None


Gastrointestinal History: Reports: None


Genitourinary History: Reports: None


Musculoskeletal History: Reports: None


Other Musculoskeletal History: Back injury


Neurological History: Reports: None


Psychiatric History: Reports: None


Endocrine/Metabolic History: Reports: None


Hematologic History: Reports: None


Immunologic History: Reports: None


Oncologic (Cancer) History: Reports: None


Dermatologic History: Reports: None





- Infectious Disease History


Infectious Disease History: Reports: None





- Past Surgical History


Head Surgeries/Procedures: Reports: None


HEENT Surgical History: Reports: None


Cardiovascular Surgical History: Reports: Coronary Artery Stent, Percutaneous 

Transluminal Angioplasty


GI Surgical History: Reports: Appendectomy, Hernia, Abdominal


Male  Surgical History: Reports: None


Neurological Surgical History: Reports: None


Musculoskeletal Surgical History: Reports: None





Social & Family History





- Family History


Family Medical History: Noncontributory


Cardiac: Reports: Hypertension, Other (See Below)


Other Cardiac Family History: father have cardiac problem


Respiratory: Reports: None


GI: Reports: None


: Reports: None


Musculoskeletal: Reports: None


Neurological: Reports: None


Psychiatric: Reports: None


Endocrine/Metabolic: Reports: None, Diabetes, type II


Hematologic: Reports: None


Immunologic: Reports: None


Dermatologic: Reports: None


Oncologic: Reports: None





- Tobacco Use


Smoking Status *Q: Current Every Day Smoker


Years of Tobacco use: 20


Packs/Tins Daily: 1


Used Tobacco, but Quit: No


Month Tobacco Last Used: June


Second Hand Smoke Exposure: Yes





- Caffeine Use


Caffeine Use: Reports: Coffee, Energy Drinks


Caffeine Use Comment: 1 monster everyday





- Alcohol Use


Days Per Week of Alcohol Use: 1


Number of Drinks Per Day: 1


Total Drinks Per Week: 1





- Recreational Drug Use


Recreational Drug Use: No





ED ROS GENERAL





- Review of Systems


Review Of Systems: ROS reveals no pertinent complaints other than HPI.





ED EXAM, GENERAL





- Physical Exam


Exam: See Below (See dictation)





Course





- Vital Signs


Last Recorded V/S: 


 Last Vital Signs











Temp  36.4 C   03/06/18 20:28


 


Pulse  81   03/06/18 20:28


 


Resp  18   03/06/18 20:28


 


BP  164/91 H  03/06/18 20:28


 


Pulse Ox  99   03/06/18 20:28














- Orders/Labs/Meds


Orders: 


 Active Orders 24 hr











 Category Date Time Status


 


 Patient Status [ADT] Stat ADT  03/06/18 21:34 Ordered


 


 Cardiac Monitoring [RC] .AS DIRECTED Care  03/06/18 20:39 Active


 


 EKG Documentation Completion [RC] STAT Care  03/06/18 20:39 Active


 


 Oxygen Therapy, ED [RC] ASDIRECTED Care  03/06/18 20:39 Active


 


 Pulse Oximetry [RC] ASDIRECTED Care  03/06/18 20:39 Active


 


 Chest 1V Frontal [CR] Stat Exams  03/06/18 20:39 Taken


 


 Sodium Chloride 0.9% [Saline Flush] Med  03/06/18 20:39 Active





 10 ml FLUSH ASDIRECTED PRN   


 


 Sodium Chloride 0.9% [Saline Flush] Med  03/06/18 20:39 Active





 2.5 ml FLUSH ASDIRECTED PRN   


 


 Saline Lock Insert [OM.PC] Stat Oth  03/06/18 20:39 Ordered








 Medication Orders





Sodium Chloride (Saline Flush)  10 ml FLUSH ASDIRECTED PRN


   PRN Reason: Keep Vein Open


Sodium Chloride (Saline Flush)  2.5 ml FLUSH ASDIRECTED PRN


   PRN Reason: Keep Vein Open








Labs: 


 Laboratory Tests











  03/06/18 03/06/18 03/06/18 Range/Units





  20:50 20:50 20:50 


 


WBC  11.85 H    (4.0-11.0)  K/uL


 


RBC  4.87    (4.50-5.90)  M/uL


 


Hgb  15.5    (13.0-17.0)  g/dL


 


Hct  43.8    (38.0-50.0)  %


 


MCV  89.9    (80.0-98.0)  fL


 


MCH  31.8    (27.0-32.0)  pg


 


MCHC  35.4    (31.0-37.0)  g/dL


 


RDW Std Deviation  41.7    (28.0-62.0)  fl


 


RDW Coeff of Sharif  13    (11.0-15.0)  %


 


Plt Count  232    (150-400)  K/uL


 


MPV  9.60    (7.40-12.00)  fL


 


Neut % (Auto)  62.2    (48.0-80.0)  %


 


Lymph % (Auto)  28.0    (16.0-40.0)  %


 


Mono % (Auto)  7.6    (0.0-15.0)  %


 


Eos % (Auto)  1.9    (0.0-7.0)  %


 


Baso % (Auto)  0.3    (0.0-1.5)  %


 


Neut # (Auto)  7.4 H    (1.4-5.7)  K/uL


 


Lymph # (Auto)  3.3 H    (0.6-2.4)  K/uL


 


Mono # (Auto)  0.9 H    (0.0-0.8)  K/uL


 


Eos # (Auto)  0.2    (0.0-0.7)  K/uL


 


Baso # (Auto)  0.0    (0.0-0.1)  K/uL


 


Nucleated RBC %  0.0    /100WBC


 


Nucleated RBCs #  0    K/uL


 


INR   1.00   


 


Sodium    139  (136-148)  mmol/L


 


Potassium    3.9  (3.5-5.1)  mmol/L


 


Chloride    102  ()  mmol/L


 


Carbon Dioxide    28.1  (21.0-32.0)  mmol/L


 


BUN    11  (7.0-18.0)  mg/dL


 


Creatinine    1.0  (0.8-1.3)  mg/dL


 


Est Cr Clr Drug Dosing    TNP  


 


Estimated GFR (MDRD)    > 60.0  ml/min


 


Glucose    108 H  ()  mg/dL


 


Calcium    8.9  (8.5-10.1)  mg/dL


 


Total Bilirubin    0.2  (0.2-1.0)  mg/dL


 


AST    25  (15-37)  U/L


 


ALT    43  (14-63)  U/L


 


Alkaline Phosphatase    73  ()  U/L


 


Troponin I    < 0.050  (0.000-0.056)  ng/mL


 


Total Protein    7.6  (6.4-8.2)  g/dL


 


Albumin    4.3  (3.4-5.0)  g/dL


 


Globulin    3.3  (2.0-3.5)  g/dL


 


Albumin/Globulin Ratio    1.3  (1.3-2.8)  











Meds: 


Medications











Generic Name Dose Route Start Last Admin





  Trade Name Freq  PRN Reason Stop Dose Admin


 


Sodium Chloride  10 ml  03/06/18 20:39  





  Saline Flush  FLUSH   





  ASDIRECTED PRN   





  Keep Vein Open   


 


Sodium Chloride  2.5 ml  03/06/18 20:39  





  Saline Flush  FLUSH   





  ASDIRECTED PRN   





  Keep Vein Open   














Discontinued Medications














Generic Name Dose Route Start Last Admin





  Trade Name Freq  PRN Reason Stop Dose Admin


 


Aspirin  243 mg  03/06/18 20:39  03/06/18 20:48





  Aspirin  PO  03/06/18 20:40  243 mg





  ONETIME ONE   Administration














Departure





- Departure


Time of Disposition: 21:39


Disposition: Refer to Observation


Condition: Good


Clinical Impression: 


 Acute coronary syndrome








- Discharge Information


Forms:  ED Department Discharge





- My Orders


Last 24 Hours: 


My Active Orders





03/06/18 20:39


Cardiac Monitoring [RC] .AS DIRECTED 


EKG Documentation Completion [RC] STAT 


Oxygen Therapy, ED [RC] ASDIRECTED 


Pulse Oximetry [RC] ASDIRECTED 


Chest 1V Frontal [CR] Stat 


Sodium Chloride 0.9% [Saline Flush]   10 ml FLUSH ASDIRECTED PRN 


Sodium Chloride 0.9% [Saline Flush]   2.5 ml FLUSH ASDIRECTED PRN 


Saline Lock Insert [OM.PC] Stat 





03/06/18 21:34


Patient Status [ADT] Stat 














- Assessment/Plan


Last 24 Hours: 


My Active Orders





03/06/18 20:39


Cardiac Monitoring [RC] .AS DIRECTED 


EKG Documentation Completion [RC] STAT 


Oxygen Therapy, ED [RC] ASDIRECTED 


Pulse Oximetry [RC] ASDIRECTED 


Chest 1V Frontal [CR] Stat 


Sodium Chloride 0.9% [Saline Flush]   10 ml FLUSH ASDIRECTED PRN 


Sodium Chloride 0.9% [Saline Flush]   2.5 ml FLUSH ASDIRECTED PRN 


Saline Lock Insert [OM.PC] Stat 





03/06/18 21:34


Patient Status [ADT] Stat

## 2018-03-07 NOTE — CR
EXAM DATE: 18



PATIENT'S AGE: 42





Patient: BRINDA HANSEN



Facility: [place], [place]

Patient ID: [id number]

Site Patient ID: [id number].

Site Accession #: [id number].

: 1975

Study: XRay Chest [id number]/6/2018 9:13:21 PM

Ordering Physician: Brenda Engle



Final Report: 

INDICATION: chest pain, sob



TECHNIQUE:

Chest 1 view. 



COMPARISON:

6/10/17 



FINDINGS:

Cardiovascular and mediastinum: Heart size and vasculature are normal in 
caliber and appearance. Mediastinum is within normal limits. 



Lungs and pleural space: Lungs are clear. No sign of infiltrate or mass. No 
sign of pleural effusion. No pneumothorax. 



Bones and soft tissues: No significant findings. 



IMPRESSION:

Unremarkable chest.





Dictated by: Rocco Marcelo MD @ 2018 21:16:53

(Electronic Signature)



Report Signed by Proxy.
[facility]

## 2019-04-15 ENCOUNTER — HOSPITAL ENCOUNTER (EMERGENCY)
Dept: HOSPITAL 56 - MW.ED | Age: 44
Discharge: NURSING, REHAB OR LONG-TERM CARE | End: 2019-04-15
Payer: COMMERCIAL

## 2019-04-15 VITALS — SYSTOLIC BLOOD PRESSURE: 124 MMHG | DIASTOLIC BLOOD PRESSURE: 85 MMHG

## 2019-04-15 DIAGNOSIS — E78.00: ICD-10-CM

## 2019-04-15 DIAGNOSIS — Z79.82: ICD-10-CM

## 2019-04-15 DIAGNOSIS — I10: ICD-10-CM

## 2019-04-15 DIAGNOSIS — I25.10: ICD-10-CM

## 2019-04-15 DIAGNOSIS — Z79.899: ICD-10-CM

## 2019-04-15 DIAGNOSIS — Z79.01: ICD-10-CM

## 2019-04-15 DIAGNOSIS — I24.9: Primary | ICD-10-CM

## 2019-04-15 DIAGNOSIS — Z95.5: ICD-10-CM

## 2019-04-15 LAB
CHLORIDE SERPL-SCNC: 100 MMOL/L (ref 98–107)
SODIUM SERPL-SCNC: 137 MMOL/L (ref 136–148)

## 2019-04-15 PROCEDURE — 96365 THER/PROPH/DIAG IV INF INIT: CPT

## 2019-04-15 PROCEDURE — 93005 ELECTROCARDIOGRAM TRACING: CPT

## 2019-04-15 PROCEDURE — 96375 TX/PRO/DX INJ NEW DRUG ADDON: CPT

## 2019-04-15 PROCEDURE — 85025 COMPLETE CBC W/AUTO DIFF WBC: CPT

## 2019-04-15 PROCEDURE — 80053 COMPREHEN METABOLIC PANEL: CPT

## 2019-04-15 PROCEDURE — 71045 X-RAY EXAM CHEST 1 VIEW: CPT

## 2019-04-15 PROCEDURE — 99285 EMERGENCY DEPT VISIT HI MDM: CPT

## 2019-04-15 PROCEDURE — 36415 COLL VENOUS BLD VENIPUNCTURE: CPT

## 2019-04-15 PROCEDURE — 85610 PROTHROMBIN TIME: CPT

## 2019-04-15 PROCEDURE — 96361 HYDRATE IV INFUSION ADD-ON: CPT

## 2019-04-15 PROCEDURE — 83690 ASSAY OF LIPASE: CPT

## 2019-04-15 PROCEDURE — 84484 ASSAY OF TROPONIN QUANT: CPT

## 2019-04-15 NOTE — CR
EXAMINATION: Portable chest radiograph.

 

HISTORY: Pain.

 

FINDINGS: 

The trachea is midline. The cardiomediastinal silhouette is within normal

limits. No pulmonary infiltrates, effusions or pneumothorax.

 

Osseous structures appear unremarkable.

 

IMPRESSION: 

No acute cardiopulmonary process.

## 2019-04-15 NOTE — EDM.PDOC
ED HPI GENERAL MEDICAL PROBLEM





- General


Chief Complaint: Chest Pain


Stated Complaint: CHEST PAIN


Time Seen by Provider: 04/15/19 12:45


Source of Information: Reports: Patient





- History of Present Illness


INITIAL COMMENTS - FREE TEXT/NARRATIVE: 





HISTORY AND PHYSICAL:


History of present illness:


[Patient presents by private vehicle





History of 3 MIs previously with 4 stents last cardiac catheter was one year 

prior, he did take 3 nitroglycerin prior to arrival pain is still 6-8 out of 10 

with continued radiation to the shoulder no radiation to neck or jaw no 

diaphoresis he does complain of some shortness of breath 





I did provide aspirin Lovenox and ultimately morphine and a nitro drip pain is 

improving he is currently at 15 g on the nitro drip will continue to titrate I 

did speak with Dr. Rona Gunn do who is accepted the patient and will see him 

through the ER in [place]





Fever nausea vomiting chills sweats





10 out of 10 pain began while driving down the road, radiates to left shoulder 

he did take 3 nitroglycerin


Review of systems: 


As per history of present illness and below otherwise all systems reviewed and 

negative.


Past medical history: 


As per history of present illness and as reviewed below otherwise 

noncontributory.


Surgical history: 


As per history of present illness and as reviewed below otherwise 

noncontributory.


Social history: 


No reported history of drug or alcohol abuse.


Family history: 


As per history of present illness and as reviewed below otherwise 

noncontributory.





Physical exam:


HEENT: Atraumatic, normocephalic, pupils reactive, negative for conjunctival 

pallor or scleral icterus, mucous membranes moist, throat clear, neck supple, 

nontender, trachea midline.


Lungs: Clear to auscultation, breath sounds equal bilaterally, chest nontender.


Heart: S1S2, regular, negative for clicks, rubs, or JVD.


Abdomen: Soft, nondistended, nontender. Negative for masses or 

hepatosplenomegaly. Negative for costovertebral tenderness.


Pelvis: Stable nontender.


Genitourinary: Deferred.


Rectal: Deferred.


Extremities: Atraumatic, negative for cords or calf pain. Neurovascular 

unremarkable.


Neuro: Awake, alert, oriented. Cranial nerves II through XII unremarkable. 

Cerebellum unremarkable. Motor and sensory unremarkable throughout. Exam 

nonfocal.





Diagnostics:


[CBC CMP UA troponin lipase


EKG


Chest 1 view


]


Therapeutics:


[ patient took 3 doses of nitroglycerin sublingual prior to arrival 


Nitroglycerin drip


Morphine 2 mg IV


Aspirin 324 mg chewable


] Lovenox 





Impression:  


 stable angina 


Acute coronary syndrome ]


Definitive disposition and diagnosis as appropriate pending reevaluation and 

review of above.


  ** Chest


Pain Score (Numeric/FACES): 6





- Related Data


 Allergies











Allergy/AdvReac Type Severity Reaction Status Date / Time


 


No Known Allergies Allergy   Verified 04/15/19 12:43











Home Meds: 


 Home Meds





Clopidogrel [Plavix] 75 mg PO DAILY 01/29/17 [History]


Metoprolol Succinate [Toprol XL 50mg] 50 mg PO BID 01/29/17 [History]


atorvaSTATin [Lipitor] 20 mg PO BEDTIME 01/29/17 [History]


Cyclobenzaprine [Flexeril] 1 tab PO TID PRN 03/20/17 [History]


Nitroglycerin [Nitrostat] 1 tab SL ASDIRECTED 03/20/17 [History]


Tapentadol HCl [Nucynta] 1 tab PO TID 03/20/17 [History]


Aspirin 81 mg PO DAILY 06/10/17 [History]


Isosorbide Mononitrate [Imdur] 30 mg PO BID 03/07/18 [History]


Pantoprazole Sodium [Protonix] 40 mg PO DAILY #30 tablet. 03/07/18 [Rx]











Past Medical History





- Past Health History


Medical/Surgical History: Denies Medical/Surgical History


HEENT History: Reports: None


Other HEENT History: wears glasses


Cardiovascular History: Reports: CAD, High Cholesterol, Hypertension, MI, 

Stents (LAD and posterolateral branch in 1/30/17, drug eluting).  Denies: Afib, 

Blood Clots/VTE/DVT


Other Cardiovascular History: Heart attack last month;  no stents


Respiratory History: Reports: None.  Denies: Asthma, COPD, PE


Gastrointestinal History: Reports: None.  Denies: GERD, GI Bleed


Genitourinary History: Reports: None


Musculoskeletal History: Reports: Back Pain, Chronic


Other Musculoskeletal History: Back injury


Neurological History: Reports: None.  Denies: CVA, TIA


Psychiatric History: Reports: None


Endocrine/Metabolic History: Reports: Obesity/BMI 30+.  Denies: Diabetes, Type 

II


Hematologic History: Reports: None


Immunologic History: Reports: None


Oncologic (Cancer) History: Reports: None


Dermatologic History: Reports: None





- Infectious Disease History


Infectious Disease History: Reports: Chicken Pox





- Past Surgical History


Head Surgeries/Procedures: Reports: None


HEENT Surgical History: Reports: None


Cardiovascular Surgical History: Reports: Coronary Artery Stent, Percutaneous 

Transluminal Angioplasty


GI Surgical History: Reports: Appendectomy, Hernia, Abdominal


Male  Surgical History: Reports: None


Neurological Surgical History: Reports: None


Musculoskeletal Surgical History: Reports: None





Social & Family History





- Family History


Family Medical History: Noncontributory


Cardiac: Reports: Hypertension, Other (See Below)


Other Cardiac Family History: father have cardiac problem


Respiratory: Reports: None


GI: Reports: None


: Reports: None


Musculoskeletal: Reports: None


Neurological: Reports: None


Psychiatric: Reports: None


Endocrine/Metabolic: Reports: None, Diabetes, type II


Hematologic: Reports: None


Immunologic: Reports: None


Dermatologic: Reports: None


Oncologic: Reports: None





- Caffeine Use


Caffeine Use: Reports: Coffee, Energy Drinks, Soda, Tea


Caffeine Use Comment: 1 monster everyday





ED ROS GENERAL





- Review of Systems


Review Of Systems: See Below





ED EXAM, GENERAL





- Physical Exam


Exam: See Below





Course





- Vital Signs


Last Recorded V/S: 


 Last Vital Signs











Temp      


 


Pulse  75   04/15/19 12:41


 


Resp  18   04/15/19 12:41


 


BP  130/88   04/15/19 12:41


 


Pulse Ox  97   04/15/19 12:41














- Orders/Labs/Meds


Orders: 


 Active Orders 24 hr











 Category Date Time Status


 


 EKG Documentation Completion [RC] STAT Care  04/15/19 12:43 Active


 


 Nitroglycerin/D5W [Nitroglycerin  25 MG/D5W 250 ML] Med  04/15/19 12:45 Active





 25 mg in 250 ml IV TITRATE   








 Medication Orders





Nitroglycerin/Dextrose (Nitroglycerin  25 Mg/D5w 250 Ml)  25 mg in 250 mls @ 3 

mls/hr IV TITRATE AL


   Last Admin: 04/15/19 13:29  Dose: 5 mcg/min, 3 mls/hr








Labs: 


 Laboratory Tests











  04/15/19 04/15/19 04/15/19 Range/Units





  12:40 12:40 12:40 


 


WBC  10.20    (4.0-11.0)  K/uL


 


RBC  4.60    (4.50-5.90)  M/uL


 


Hgb  14.4    (13.0-17.0)  g/dL


 


Hct  42.7    (38.0-50.0)  %


 


MCV  92.8    (80.0-98.0)  fL


 


MCH  31.3    (27.0-32.0)  pg


 


MCHC  33.7    (31.0-37.0)  g/dL


 


RDW Std Deviation  45.3    (28.0-62.0)  fl


 


RDW Coeff of Sharif  13    (11.0-15.0)  %


 


Plt Count  255    (150-400)  K/uL


 


MPV  9.50    (7.40-12.00)  fL


 


Neut % (Auto)  66.2    (48.0-80.0)  %


 


Lymph % (Auto)  26.2    (16.0-40.0)  %


 


Mono % (Auto)  6.0    (0.0-15.0)  %


 


Eos % (Auto)  1.4    (0.0-7.0)  %


 


Baso % (Auto)  0.2    (0.0-1.5)  %


 


Neut # (Auto)  6.8 H    (1.4-5.7)  K/uL


 


Lymph # (Auto)  2.7 H    (0.6-2.4)  K/uL


 


Mono # (Auto)  0.6    (0.0-0.8)  K/uL


 


Eos # (Auto)  0.1    (0.0-0.7)  K/uL


 


Baso # (Auto)  0.0    (0.0-0.1)  K/uL


 


Nucleated RBC %  0.0    /100WBC


 


Nucleated RBCs #  0    K/uL


 


INR   1.00   


 


Sodium    137  (136-148)  mmol/L


 


Potassium    4.0  (3.5-5.1)  mmol/L


 


Chloride    100  ()  mmol/L


 


Carbon Dioxide    26.5  (21.0-32.0)  mmol/L


 


BUN    7  (7.0-18.0)  mg/dL


 


Creatinine    1.1  (0.8-1.3)  mg/dL


 


Est Cr Clr Drug Dosing    93.08  mL/min


 


Estimated GFR (MDRD)    > 60.0  ml/min


 


Glucose    131 H  ()  mg/dL


 


Calcium    9.2  (8.5-10.1)  mg/dL


 


Total Bilirubin    0.4  (0.2-1.0)  mg/dL


 


AST    28  (15-37)  IU/L


 


ALT    66 H  (14-63)  IU/L


 


Alkaline Phosphatase    94  ()  U/L


 


Troponin I    < 0.050  (0.000-0.056)  ng/mL


 


Total Protein    8.0  (6.4-8.2)  g/dL


 


Albumin    4.2  (3.4-5.0)  g/dL


 


Globulin    3.8  (2.6-4.0)  g/dL


 


Albumin/Globulin Ratio    1.1  (0.9-1.6)  


 


Lipase    217  ()  U/L











Meds: 


Medications











Generic Name Dose Route Start Last Admin





  Trade Name Freq  PRN Reason Stop Dose Admin


 


Nitroglycerin/Dextrose  25 mg in 250 mls @ 3 mls/hr  04/15/19 12:45  04/15/19 13

:29





  Nitroglycerin  25 Mg/D5w 250 Ml  IV   5 mcg/min





  TITRATE AL   3 mls/hr





     Administration





     





     





  5 MCG/MIN   














Discontinued Medications














Generic Name Dose Route Start Last Admin





  Trade Name Freq  PRN Reason Stop Dose Admin


 


Aspirin  324 mg  04/15/19 12:43  04/15/19 12:48





  Aspirin  PO  04/15/19 12:44  324 mg





  ONETIME ONE   Administration





     





     





     





     


 


Aspirin  Confirm  04/15/19 12:43  04/15/19 13:47





  Aspirin  Administered  04/15/19 12:44  Not Given





  Dose   





  324 mg   





  .ROUTE   





  .STK-MED ONE   





     





     





     





     


 


Enoxaparin Sodium  130 mg  04/15/19 12:44  04/15/19 13:29





  Lovenox  SUBCUT  04/15/19 12:45  130 mg





  ONETIME ONE   Administration





     





     





     





     


 


Sodium Chloride  1,000 mls @ 999 mls/hr  04/15/19 12:43  04/15/19 12:47





  Normal Saline  IV  04/15/19 13:43  999 mls/hr





  STAT ONE   Administration





     





     





     





     


 


Morphine Sulfate  2 mg  04/15/19 12:47  04/15/19 12:52





  Morphine  IVPUSH  04/15/19 12:48  2 mg





  ONETIME ONE   Administration





     





     





     





     














Departure





- Departure


Time of Disposition: 14:03


Disposition: DC[facility] to [facility]


Condition: Poor


Clinical Impression: 


 Acute coronary syndrome








- Discharge Information


Referrals: 


PCP,Unknown [Primary Care Provider] - 


Forms:  ED Department Discharge





- My Orders


Last 24 Hours: 


My Active Orders





04/15/19 12:43


EKG Documentation Completion [RC] STAT 





04/15/19 12:45


Nitroglycerin/D5W [Nitroglycerin  25 MG/D5W 250 ML] 25 mg in 250 ml IV TITRATE 














- Assessment/Plan


Last 24 Hours: 


My Active Orders





04/15/19 12:43


EKG Documentation Completion [RC] STAT 





04/15/19 12:45


Nitroglycerin/D5W [Nitroglycerin  25 MG/D5W 250 ML] 25 mg in 250 ml IV TITRATE

## 2019-07-26 NOTE — EDM.PDOC
ED HPI GENERAL MEDICAL PROBLEM





- General


Chief Complaint: ENT Problem


Stated Complaint: SWELLING ON RIGHT SIDE OF FACE, NAUSEA


Time Seen by Provider: 07/26/19 04:24


Source of Information: Reports: Patient





- History of Present Illness


INITIAL COMMENTS - FREE TEXT/NARRATIVE: 





HISTORY AND PHYSICAL:


History of present illness:


[Patient presents with dental pain and facial swelling on the right, poor 

dentition noted on the right upper tooth erosion to the gumline, 8 out of 10 

pain awoke him from sleep. He is under the care of a dentist however is not is 

been seen for this infection





No fever chills sweats





Review of systems: 


As per history of present illness and below otherwise all systems reviewed and 

negative.


Past medical history: 


As per history of present illness and as reviewed below otherwise 

noncontributory.


Surgical history: 


As per history of present illness and as reviewed below otherwise 

noncontributory.


Social history: 


No reported history of drug or alcohol abuse.


Family history: 


As per history of present illness and as reviewed below otherwise 

noncontributory.


Physical exam:


HEENT: Atraumatic, normocephalic, pupils reactive, negative for conjunctival 

pallor or scleral icterus, mucous membranes moist, throat clear, neck supple, 

nontender, trachea midline.


Lungs: Clear to auscultation, breath sounds equal bilaterally, chest nontender.


Heart: S1S2, regular, negative for clicks, rubs, or JVD.


Abdomen: Soft, nondistended, nontender. Negative for masses or 

hepatosplenomegaly. Negative for costovertebral tenderness.


Pelvis: Stable nontender.


Genitourinary: Deferred.


Rectal: Deferred.


Extremities: Atraumatic, negative for cords or calf pain. Neurovascular 

unremarkable.


Neuro: Awake, alert, oriented. Cranial nerves II through XII unremarkable. 

Cerebellum unremarkable. Motor and sensory unremarkable throughout. Exam 

nonfocal.


Diagnostics:


[Clinical ]


Therapeutics:


[1 g Rocephin IM


Augmentin


Dental balls


Norco 7.5 by mouth now


Toradol ]


Follow-up with dentist in a.m. as planned





Impression: 


[ dental abscess ]


Definitive disposition and diagnosis as appropriate pending reevaluation and 

review of above.


  ** dental pain


Pain Score (Numeric/FACES): 7





- Related Data


 Allergies











Allergy/AdvReac Type Severity Reaction Status Date / Time


 


No Known Allergies Allergy   Verified 07/26/19 04:21











Home Meds: 


 Home Meds





Clopidogrel [Plavix] 75 mg PO DAILY 01/29/17 [History]


Metoprolol Succinate [Toprol XL 50mg] 50 mg PO BID 01/29/17 [History]


atorvaSTATin [Lipitor] 20 mg PO BEDTIME 01/29/17 [History]


Cyclobenzaprine [Flexeril] 1 tab PO TID PRN 03/20/17 [History]


Nitroglycerin [Nitrostat] 1 tab SL ASDIRECTED 03/20/17 [History]


Tapentadol HCl [Nucynta] 1 tab PO TID 03/20/17 [History]


Aspirin 81 mg PO DAILY 06/10/17 [History]


Pantoprazole Sodium [Protonix] 40 mg PO DAILY #30 tablet. 03/07/18 [Rx]


Nitroglycerin [Nitroglycerin Patch 0.2 MG/Hr] 5 mg TRDERM DAILY 07/26/19 [

History]











Past Medical History





- Past Health History


Medical/Surgical History: Denies Medical/Surgical History


HEENT History: Reports: None


Other HEENT History: wears glasses


Cardiovascular History: Reports: CAD, High Cholesterol, Hypertension, MI, 

Stents (LAD and posterolateral branch in 1/30/17, drug eluting).  Denies: Afib, 

Blood Clots/VTE/DVT


Other Cardiovascular History: Heart attack last month;  no stents


Respiratory History: Reports: None.  Denies: Asthma, COPD, PE


Gastrointestinal History: Reports: None.  Denies: GERD, GI Bleed


Genitourinary History: Reports: None


Musculoskeletal History: Reports: Back Pain, Chronic


Other Musculoskeletal History: Back injury


Neurological History: Reports: None.  Denies: CVA, TIA


Psychiatric History: Reports: None


Endocrine/Metabolic History: Reports: Obesity/BMI 30+.  Denies: Diabetes, Type 

II


Hematologic History: Reports: None


Immunologic History: Reports: None


Oncologic (Cancer) History: Reports: None


Dermatologic History: Reports: None





- Infectious Disease History


Infectious Disease History: Reports: Chicken Pox





- Past Surgical History


Head Surgeries/Procedures: Reports: None


HEENT Surgical History: Reports: None


Cardiovascular Surgical History: Reports: Coronary Artery Stent, Percutaneous 

Transluminal Angioplasty


GI Surgical History: Reports: Appendectomy, Hernia, Abdominal


Male  Surgical History: Reports: None


Neurological Surgical History: Reports: None


Musculoskeletal Surgical History: Reports: None





Social & Family History





- Family History


Family Medical History: Noncontributory


Cardiac: Reports: Hypertension, Other (See Below)


Other Cardiac Family History: father have cardiac problem


Respiratory: Reports: None


GI: Reports: None


: Reports: None


Musculoskeletal: Reports: None


Neurological: Reports: None


Psychiatric: Reports: None


Endocrine/Metabolic: Reports: None, Diabetes, type II


Hematologic: Reports: None


Immunologic: Reports: None


Dermatologic: Reports: None


Oncologic: Reports: None





- Caffeine Use


Caffeine Use: Reports: Coffee, Energy Drinks, Soda, Tea


Caffeine Use Comment: 1 monster everyday





ED ROS GENERAL





- Review of Systems


Review Of Systems: See Below





ED EXAM, GENERAL





- Physical Exam


Exam: See Below





Course





- Vital Signs


Last Recorded V/S: 


 Last Vital Signs











Temp  96.9 F   07/26/19 04:15


 


Pulse  73   07/26/19 04:15


 


Resp  18   07/26/19 04:15


 


BP  124/72   07/26/19 04:15


 


Pulse Ox  98   07/26/19 04:15














- Orders/Labs/Meds


Meds: 


Medications














Discontinued Medications














Generic Name Dose Route Start Last Admin





  Trade Name Rhona  PRN Reason Stop Dose Admin


 


Benzocaine  2 each  07/26/19 04:23  





  Hurricaine One 20%  MUCMEM  07/26/19 04:24  





  ONETIME ONE   





     





     





     





     


 


Ceftriaxone Sodium  1 gm  07/26/19 04:23  





  Rocephin  IM  07/26/19 04:24  





  ONETIME ONE   





     





     





     





     


 


Lidocaine HCl  15 ml  07/26/19 04:23  





  Xylocaine 2% Viscous  PO  07/26/19 04:24  





  ONETIME ONE   





     





     





     





     














Departure





- Departure


Time of Disposition: 04:26


Disposition: Home, Self-Care 01


Condition: Good, Fair


Clinical Impression: 


 Dental abscess








- Discharge Information


Referrals: 


Rogelio Solomon MD [Primary Care Provider] - 


Forms:  ED Department Discharge


Additional Instructions: 


The following information is given to patients seen in the emergency department 

who are being discharged to home. This information is to outline your options 

for follow-up care. We provide all patients seen in our emergency department 

with a follow-up referral.





The need for follow-up, as well as the timing and circumstances, are variable 

depending upon the specifics of your emergency department visit.





If you don't have a primary care physician on staff, we will provide you with a 

referral. We always advise you to contact your personal physician following an 

emergency department visit to inform them of the circumstance of the visit and 

for follow-up with them and/or the need for any referrals to a consulting 

specialist.





The emergency department will also refer you to a specialist when appropriate. 

This referral assures that you have the opportunity for follow-up care with a 

specialist. All of these measure are taken in an effort to provide you with 

optimal care, which includes your follow-up.





Under all circumstances we always encourage you to contact your private 

physician who remains a resource for coordinating your care. When calling for 

follow-up care, please make the office aware that this follow-up is from your 

recent emergency room visit. If for any reason you are refused follow-up, 

please contact the [facility] emergency department at [phone] 

and asked to speak to the emergency department charge nurse.